# Patient Record
Sex: MALE | Race: WHITE | Employment: FULL TIME | URBAN - METROPOLITAN AREA
[De-identification: names, ages, dates, MRNs, and addresses within clinical notes are randomized per-mention and may not be internally consistent; named-entity substitution may affect disease eponyms.]

---

## 2018-10-03 ENCOUNTER — HOSPITAL ENCOUNTER (OUTPATIENT)
Age: 33
Discharge: HOME | End: 2018-10-03
Payer: COMMERCIAL

## 2018-10-03 VITALS
OXYGEN SATURATION: 96 % | HEART RATE: 90 BPM | DIASTOLIC BLOOD PRESSURE: 89 MMHG | SYSTOLIC BLOOD PRESSURE: 118 MMHG | RESPIRATION RATE: 20 BRPM | TEMPERATURE: 99 F

## 2018-10-03 DIAGNOSIS — Z91.89 AT RISK FOR RETAINED FOREIGN BODY: ICD-10-CM

## 2018-10-03 DIAGNOSIS — S71.112A LACERATION OF LEFT THIGH, INITIAL ENCOUNTER: Primary | ICD-10-CM

## 2018-10-03 PROCEDURE — 12002 RPR S/N/AX/GEN/TRNK2.6-7.5CM: CPT

## 2018-10-03 PROCEDURE — 99214 OFFICE O/P EST MOD 30 MIN: CPT

## 2018-10-03 PROCEDURE — 96372 THER/PROPH/DIAG INJ SC/IM: CPT

## 2018-10-03 RX ORDER — CEPHALEXIN 500 MG/1
500 TABLET ORAL 4 TIMES DAILY
Qty: 40 TABLET | Refills: 0 | Status: SHIPPED | OUTPATIENT
Start: 2018-10-03 | End: 2018-10-13

## 2018-10-04 NOTE — ED PROVIDER NOTES
Patient Seen in: [facility]    History   Patient presents with:  Laceration    Stated Complaint: lt knee lac    HPI    27-year-old male here with complaint of a laceration to his left thigh that occurred prior to arrival.  Lory Eyes: Conjunctivae and EOM are normal. Pupils are equal, round, and reactive to light. Neck: Normal range of motion. Neck supple. Cardiovascular: Normal rate, regular rhythm and normal heart sounds.    Pulmonary/Chest: Effort normal and breath sounds no I discussed the plan of care with the patient, who expresses understanding. All questions and concerns are addressed to the patients satisfaction prior to discharge today.                Disposition and Plan     Clinical Impression:  Laceration of left thig

## 2018-10-18 ENCOUNTER — HOSPITAL ENCOUNTER (OUTPATIENT)
Age: 33
Discharge: HOME | End: 2018-10-18
Payer: COMMERCIAL

## 2018-10-18 VITALS
DIASTOLIC BLOOD PRESSURE: 61 MMHG | SYSTOLIC BLOOD PRESSURE: 110 MMHG | OXYGEN SATURATION: 97 % | RESPIRATION RATE: 16 BRPM | TEMPERATURE: 98 F | HEART RATE: 80 BPM | WEIGHT: 185 LBS | BODY MASS INDEX: 27 KG/M2

## 2018-10-18 DIAGNOSIS — Z48.02 ENCOUNTER FOR REMOVAL OF SUTURES: Primary | ICD-10-CM

## 2018-10-18 NOTE — ED PROVIDER NOTES
Patient Seen in: [facility]    History   Patient presents with:  Sut Stap Abhinav (ingtegumentary)    Stated Complaint: SUTURE REMOVAL    HPI    Patient is a pleasant 15-year-old male.   He arrives for evaluation/suture remov majority of the sutures. This will be soaked with saline and peroxide to soften. I will then attempt removal.  No evidence of infection    After softening the scab as described above, I was able to remove all 11 sutures without any wound dehiscence.     D

## 2021-03-14 ENCOUNTER — HOSPITAL ENCOUNTER (OUTPATIENT)
Age: 36
Discharge: HOME | End: 2021-03-14
Payer: COMMERCIAL

## 2021-03-14 VITALS
RESPIRATION RATE: 16 BRPM | HEART RATE: 68 BPM | OXYGEN SATURATION: 99 % | SYSTOLIC BLOOD PRESSURE: 121 MMHG | BODY MASS INDEX: 29 KG/M2 | DIASTOLIC BLOOD PRESSURE: 62 MMHG | TEMPERATURE: 99 F | WEIGHT: 200 LBS

## 2021-03-14 DIAGNOSIS — J30.89 ALLERGIC RHINITIS DUE TO OTHER ALLERGIC TRIGGER, UNSPECIFIED SEASONALITY: Primary | ICD-10-CM

## 2021-03-14 LAB — SARS-COV-2 RNA RESP QL NAA+PROBE: NOT DETECTED

## 2021-03-14 PROCEDURE — 99212 OFFICE O/P EST SF 10 MIN: CPT

## 2021-03-14 NOTE — ED PROVIDER NOTES
Patient Seen in: [facility] [place]      History   Patient presents with:  Cough/URI    Stated Complaint: STUFFY NOSE    HPI/Subjective:   HPI  Quique Lopez is a 20-year-old male who presents today with concern for possible COVID 19.   He denies past me sentences, without difficulty. Lungs are clear to auscultation. No wheezes, rales or rhonchi appreciated. Musculoskeletal: Normal range of motion. No edema or tenderness. Neurological: CN III - XII grossly intact, normal strength and sensation.    Sk

## 2021-04-08 ENCOUNTER — HOSPITAL ENCOUNTER (OUTPATIENT)
Age: 36
Discharge: HOME | End: 2021-04-08
Payer: COMMERCIAL

## 2021-04-08 VITALS
RESPIRATION RATE: 16 BRPM | OXYGEN SATURATION: 97 % | SYSTOLIC BLOOD PRESSURE: 126 MMHG | TEMPERATURE: 98 F | DIASTOLIC BLOOD PRESSURE: 39 MMHG | HEART RATE: 72 BPM

## 2021-04-08 DIAGNOSIS — Z20.822 LAB TEST NEGATIVE FOR COVID-19 VIRUS: ICD-10-CM

## 2021-04-08 DIAGNOSIS — Z20.822 CLOSE EXPOSURE TO COVID-19 VIRUS: ICD-10-CM

## 2021-04-08 DIAGNOSIS — R09.81 NASAL CONGESTION: Primary | ICD-10-CM

## 2021-04-08 PROCEDURE — 99212 OFFICE O/P EST SF 10 MIN: CPT

## 2021-04-08 NOTE — ED INITIAL ASSESSMENT (HPI)
Pt was exposed to covid by his father in law for the past 4-5 day.   Pt has had allergy symptoms for 3 weeks

## 2021-04-09 NOTE — ED PROVIDER NOTES
Patient Seen in: [facility]      History   Patient presents with:  Covid-19 Test    Stated Complaint: COVID test    HPI/Subjective:   HPI    Varsha Donahue is a 49-year-old male who presents today with concern for possible COVID 19.   He denies pa are clear to auscultation. No wheezes, rales or rhonchi appreciated. Musculoskeletal: Normal range of motion. No edema or tenderness. Neurological: CN III - XII grossly intact, normal strength and sensation. Skin: Skin is warm and dry.  No rash note